# Patient Record
Sex: FEMALE | Race: WHITE | NOT HISPANIC OR LATINO | URBAN - METROPOLITAN AREA
[De-identification: names, ages, dates, MRNs, and addresses within clinical notes are randomized per-mention and may not be internally consistent; named-entity substitution may affect disease eponyms.]

---

## 2021-12-16 ENCOUNTER — TELEPHONE ENCOUNTER (OUTPATIENT)
Dept: URBAN - METROPOLITAN AREA CLINIC 72 | Facility: CLINIC | Age: 61
End: 2021-12-16

## 2022-01-25 ENCOUNTER — OFFICE VISIT (OUTPATIENT)
Dept: URBAN - METROPOLITAN AREA CLINIC 72 | Facility: CLINIC | Age: 62
End: 2022-01-25

## 2022-03-04 ENCOUNTER — WEB ENCOUNTER (OUTPATIENT)
Dept: URBAN - METROPOLITAN AREA CLINIC 72 | Facility: CLINIC | Age: 62
End: 2022-03-04

## 2022-03-04 ENCOUNTER — OFFICE VISIT (OUTPATIENT)
Dept: URBAN - METROPOLITAN AREA CLINIC 72 | Facility: CLINIC | Age: 62
End: 2022-03-04
Payer: COMMERCIAL

## 2022-03-04 VITALS
RESPIRATION RATE: 16 BRPM | TEMPERATURE: 98.1 F | HEART RATE: 85 BPM | SYSTOLIC BLOOD PRESSURE: 137 MMHG | DIASTOLIC BLOOD PRESSURE: 95 MMHG | WEIGHT: 169 LBS | HEIGHT: 68 IN | BODY MASS INDEX: 25.61 KG/M2

## 2022-03-04 DIAGNOSIS — K90.9 INTESTINAL MALABSORPTION, UNSPECIFIED: ICD-10-CM

## 2022-03-04 DIAGNOSIS — R19.7 DIARRHEA, UNSPECIFIED: ICD-10-CM

## 2022-03-04 DIAGNOSIS — K21.9 GERD WITHOUT ESOPHAGITIS: ICD-10-CM

## 2022-03-04 DIAGNOSIS — Z86.010 HISTORY OF COLON POLYPS: ICD-10-CM

## 2022-03-04 PROCEDURE — 99204 OFFICE O/P NEW MOD 45 MIN: CPT | Performed by: INTERNAL MEDICINE

## 2022-03-04 RX ORDER — HYDROCHLOROTHIAZIDE 25 MG/1
1 TABLET IN THE MORNING TABLET ORAL ONCE A DAY
Status: ACTIVE | COMMUNITY

## 2022-03-04 RX ORDER — OMEPRAZOLE 20 MG/1
1 CAPSULE 30 MINUTES BEFORE MORNING MEAL CAPSULE, DELAYED RELEASE ORAL ONCE A DAY
Qty: 30 | OUTPATIENT
Start: 2022-03-04

## 2022-03-04 RX ORDER — FAMOTIDINE 20 MG/1
1 TABLET AT BEDTIME AS NEEDED TABLET, FILM COATED ORAL
Status: ACTIVE | COMMUNITY

## 2022-03-04 RX ORDER — LORATADINE 10 MG
1 TABLET TABLET ORAL ONCE A DAY
Status: ACTIVE | COMMUNITY

## 2022-03-04 RX ORDER — SEMAGLUTIDE 0.5 MG/.5ML
0.5 ML INJECTION, SOLUTION SUBCUTANEOUS
Status: ACTIVE | COMMUNITY

## 2022-03-04 RX ORDER — OMEPRAZOLE 20 MG/1
1 CAPSULE 30 MINUTES BEFORE MORNING MEAL CAPSULE, DELAYED RELEASE ORAL
Status: ACTIVE | COMMUNITY

## 2022-03-04 RX ORDER — COLESTIPOL HYDROCHLORIDE 1 G/1
2 TABLETS TABLET, FILM COATED ORAL ONCE A DAY
Qty: 60 | OUTPATIENT
Start: 2022-03-04

## 2022-03-04 RX ORDER — LOPERAMIDE HYDROCHLORIDE AND SIMETHICONE 2; 125 MG/1; MG/1
1 TABLET TABLET ORAL
Status: ACTIVE | COMMUNITY

## 2022-03-04 RX ORDER — LOSARTAN POTASSIUM 50 MG/1
1 TABLET TABLET ORAL ONCE A DAY
Status: ACTIVE | COMMUNITY

## 2022-03-04 NOTE — HPI-TODAY'S VISIT:
Mrs. Israel is a pleasant 61-year-old female who presents as a new patient for evaluation for colonoscopy, she was referred by Dr. Chiquita Webster.  She reports she is not due for colonoscopy. She reports that she has issues with diarrhea, has had diarrhea since having cholecystectomy. She takes cholestyramine but reports that it leads to cough the patient. She also notes issues with GERD. Has felt like things get stuck in her esophagus intermittently.

## 2022-05-12 ENCOUNTER — TELEPHONE ENCOUNTER (OUTPATIENT)
Dept: URBAN - METROPOLITAN AREA CLINIC 72 | Facility: CLINIC | Age: 62
End: 2022-05-12

## 2022-05-12 RX ORDER — COLESTIPOL HYDROCHLORIDE 1 G/1
2 TABLETS TABLET, FILM COATED ORAL ONCE A DAY
Qty: 60
Start: 2022-03-04

## 2022-07-12 ENCOUNTER — TELEPHONE ENCOUNTER (OUTPATIENT)
Dept: URBAN - METROPOLITAN AREA CLINIC 72 | Facility: CLINIC | Age: 62
End: 2022-07-12

## 2022-08-30 ENCOUNTER — TELEPHONE ENCOUNTER (OUTPATIENT)
Dept: URBAN - METROPOLITAN AREA CLINIC 72 | Facility: CLINIC | Age: 62
End: 2022-08-30

## 2022-09-01 PROBLEM — 62315008: Status: ACTIVE | Noted: 2022-08-31

## 2022-09-01 PROBLEM — 275978004 SCREENING FOR MALIGNANT NEOPLASM OF COLON: Status: ACTIVE | Noted: 2022-08-31

## 2022-09-30 ENCOUNTER — WEB ENCOUNTER (OUTPATIENT)
Dept: URBAN - METROPOLITAN AREA CLINIC 72 | Facility: CLINIC | Age: 62
End: 2022-09-30

## 2022-10-03 ENCOUNTER — OFFICE VISIT (OUTPATIENT)
Dept: URBAN - METROPOLITAN AREA MEDICAL CENTER 40 | Facility: MEDICAL CENTER | Age: 62
End: 2022-10-03

## 2022-11-11 ENCOUNTER — OFFICE VISIT (OUTPATIENT)
Dept: URBAN - METROPOLITAN AREA CLINIC 72 | Facility: CLINIC | Age: 62
End: 2022-11-11

## 2023-01-10 ENCOUNTER — WEB ENCOUNTER (OUTPATIENT)
Dept: URBAN - METROPOLITAN AREA CLINIC 72 | Facility: CLINIC | Age: 63
End: 2023-01-10

## 2023-01-10 RX ORDER — COLESTIPOL HYDROCHLORIDE 1 G/1
2 TABLETS TABLET, FILM COATED ORAL ONCE A DAY
Qty: 60
Start: 2022-03-04

## 2023-01-11 ENCOUNTER — WEB ENCOUNTER (OUTPATIENT)
Dept: URBAN - METROPOLITAN AREA CLINIC 72 | Facility: CLINIC | Age: 63
End: 2023-01-11

## 2023-02-18 ENCOUNTER — WEB ENCOUNTER (OUTPATIENT)
Dept: URBAN - METROPOLITAN AREA CLINIC 72 | Facility: CLINIC | Age: 63
End: 2023-02-18

## 2023-02-18 RX ORDER — COLESTIPOL HYDROCHLORIDE 1 G/1
TAKE TWO TABLETS BY MOUTH ONE TIME DAILY FOR 30 DAYS TABLET ORAL
Qty: 60 TABLET | Refills: 0

## 2023-02-21 ENCOUNTER — WEB ENCOUNTER (OUTPATIENT)
Dept: URBAN - METROPOLITAN AREA CLINIC 72 | Facility: CLINIC | Age: 63
End: 2023-02-21

## 2023-02-21 ENCOUNTER — ERX REFILL RESPONSE (OUTPATIENT)
Dept: URBAN - METROPOLITAN AREA CLINIC 72 | Facility: CLINIC | Age: 63
End: 2023-02-21

## 2023-02-21 RX ORDER — COLESTIPOL HYDROCHLORIDE 1 G/1
2 TABLETS TABLET, FILM COATED ORAL ONCE A DAY
Qty: 60 | OUTPATIENT

## 2023-02-21 RX ORDER — COLESTIPOL HYDROCHLORIDE 1 G/1
TAKE TWO TABLETS BY MOUTH ONE TIME DAILY FOR 30 DAYS TABLET ORAL
Qty: 60 TABLET | Refills: 0 | OUTPATIENT

## 2023-02-25 ENCOUNTER — WEB ENCOUNTER (OUTPATIENT)
Dept: URBAN - METROPOLITAN AREA CLINIC 72 | Facility: CLINIC | Age: 63
End: 2023-02-25

## 2023-02-25 RX ORDER — COLESTIPOL HYDROCHLORIDE 1 G/1
TAKE TWO TABLETS BY MOUTH ONE TIME DAILY FOR 30 DAYS TABLET ORAL
Qty: 60 TABLET | Refills: 0

## 2023-03-01 ENCOUNTER — DASHBOARD ENCOUNTERS (OUTPATIENT)
Age: 63
End: 2023-03-01

## 2023-03-01 ENCOUNTER — WEB ENCOUNTER (OUTPATIENT)
Dept: URBAN - METROPOLITAN AREA CLINIC 72 | Facility: CLINIC | Age: 63
End: 2023-03-01

## 2023-03-01 ENCOUNTER — OFFICE VISIT (OUTPATIENT)
Dept: URBAN - METROPOLITAN AREA CLINIC 72 | Facility: CLINIC | Age: 63
End: 2023-03-01
Payer: COMMERCIAL

## 2023-03-01 VITALS
BODY MASS INDEX: 22.58 KG/M2 | SYSTOLIC BLOOD PRESSURE: 127 MMHG | TEMPERATURE: 97.5 F | HEIGHT: 68 IN | WEIGHT: 149 LBS | DIASTOLIC BLOOD PRESSURE: 84 MMHG | HEART RATE: 98 BPM

## 2023-03-01 DIAGNOSIS — K90.9 INTESTINAL MALABSORPTION, UNSPECIFIED: ICD-10-CM

## 2023-03-01 DIAGNOSIS — K21.9 GERD WITHOUT ESOPHAGITIS: ICD-10-CM

## 2023-03-01 DIAGNOSIS — Z86.010 HISTORY OF COLON POLYPS: ICD-10-CM

## 2023-03-01 PROBLEM — 62315008: Status: ACTIVE | Noted: 2022-03-04

## 2023-03-01 PROBLEM — 428283002: Status: ACTIVE | Noted: 2022-03-04

## 2023-03-01 PROBLEM — 266435005: Status: ACTIVE | Noted: 2022-03-04

## 2023-03-01 PROCEDURE — 99213 OFFICE O/P EST LOW 20 MIN: CPT | Performed by: INTERNAL MEDICINE

## 2023-03-01 RX ORDER — COLESTIPOL HYDROCHLORIDE 1 G/1
TAKE TWO TABLETS BY MOUTH ONE TIME DAILY FOR 30 DAYS TABLET ORAL
Qty: 60 TABLET | Refills: 0 | Status: ACTIVE | COMMUNITY

## 2023-03-01 RX ORDER — HYDROCHLOROTHIAZIDE 25 MG/1
1 TABLET IN THE MORNING TABLET ORAL ONCE A DAY
Status: ON HOLD | COMMUNITY

## 2023-03-01 RX ORDER — LOPERAMIDE HYDROCHLORIDE AND SIMETHICONE 2; 125 MG/1; MG/1
1 TABLET TABLET ORAL
Status: ACTIVE | COMMUNITY

## 2023-03-01 RX ORDER — FAMOTIDINE 20 MG/1
1 TABLET AT BEDTIME AS NEEDED TABLET, FILM COATED ORAL
Status: ACTIVE | COMMUNITY

## 2023-03-01 RX ORDER — LORATADINE 10 MG
1 TABLET TABLET ORAL ONCE A DAY
Status: ACTIVE | COMMUNITY

## 2023-03-01 RX ORDER — OMEPRAZOLE 20 MG/1
1 CAPSULE 30 MINUTES BEFORE MORNING MEAL CAPSULE, DELAYED RELEASE ORAL ONCE A DAY
Qty: 30 | Status: ACTIVE | COMMUNITY
Start: 2022-03-04

## 2023-03-01 RX ORDER — COLESTIPOL HYDROCHLORIDE 1 G/1
2 TABLETS TABLET ORAL ONCE A DAY
Qty: 180 TABLET | Refills: 2

## 2023-03-01 RX ORDER — SEMAGLUTIDE 0.5 MG/.5ML
0.5 ML INJECTION, SOLUTION SUBCUTANEOUS
Status: ACTIVE | COMMUNITY

## 2023-03-01 RX ORDER — LOSARTAN POTASSIUM 50 MG/1
1 TABLET TABLET ORAL ONCE A DAY
Status: ACTIVE | COMMUNITY

## 2023-03-01 RX ORDER — OMEPRAZOLE 20 MG/1
1 CAPSULE 30 MINUTES BEFORE MORNING MEAL CAPSULE, DELAYED RELEASE ORAL
Status: ACTIVE | COMMUNITY

## 2023-03-01 NOTE — HPI-TODAY'S VISIT:
Mrs. Israel returns for follow-up, recall she is a pleasant 62-year-old female last seen in office on 3/4/2022.  She has history of bile salt induced diarrhea, history of colon polyps and GERD.  At her last visit we started her on omeprazole and colestipol.  She is not yet due for colonoscopy.  She did occasionally have issues with food getting stuck and we encouraged an upper endoscopy which she ultimately canceled the procedure.  She now returns for follow-up. Her dysphagia has resolved no issues with reflux she also notes that she found records from EGD when she last had a colonoscopy and everything there was normal.  She would like to try to wean the omeprazole, currently taking 20 mg a day try to drop this to every other day.  She will try famotidine as needed if her symptoms return.  She is getting good control on the colestipol asked for refill of this.  No complaints today feeling well, has had some weight loss relates this to her Wegovy use.  Normal stools.

## 2023-04-11 ENCOUNTER — ERX REFILL RESPONSE (OUTPATIENT)
Dept: URBAN - METROPOLITAN AREA CLINIC 72 | Facility: CLINIC | Age: 63
End: 2023-04-11

## 2023-04-11 RX ORDER — COLESTIPOL HYDROCHLORIDE 1 G/1
2 TABLETS TABLET ORAL ONCE A DAY
Qty: 180 TABLET | Refills: 2 | OUTPATIENT

## 2024-01-18 ENCOUNTER — TELEPHONE ENCOUNTER (OUTPATIENT)
Dept: URBAN - METROPOLITAN AREA CLINIC 113 | Facility: CLINIC | Age: 64
End: 2024-01-18

## 2024-01-18 RX ORDER — SOD SULF/POT CHLORIDE/MAG SULF 1.479 G
12 TABLETS TABLET ORAL
Qty: 24 | Refills: 0 | OUTPATIENT
Start: 2024-01-19 | End: 2024-01-20

## 2024-01-19 ENCOUNTER — LAB OUTSIDE AN ENCOUNTER (OUTPATIENT)
Dept: URBAN - METROPOLITAN AREA CLINIC 113 | Facility: CLINIC | Age: 64
End: 2024-01-19

## 2024-07-26 ENCOUNTER — ERX REFILL RESPONSE (OUTPATIENT)
Dept: URBAN - METROPOLITAN AREA CLINIC 72 | Facility: CLINIC | Age: 64
End: 2024-07-26

## 2024-07-26 RX ORDER — COLESTIPOL HYDROCHLORIDE 1 G/1
TAKE TWO TABLETS BY MOUTH ONE TIME DAILY TABLET ORAL
Qty: 180 TABLET | Refills: 0 | OUTPATIENT

## 2024-07-26 RX ORDER — COLESTIPOL HYDROCHLORIDE 1 G/1
2 TABLETS TABLET ORAL ONCE A DAY
Qty: 180 | Refills: 0 | OUTPATIENT

## 2024-09-12 ENCOUNTER — TELEPHONE ENCOUNTER (OUTPATIENT)
Dept: URBAN - METROPOLITAN AREA CLINIC 113 | Facility: CLINIC | Age: 64
End: 2024-09-12

## 2024-09-19 ENCOUNTER — OFFICE VISIT (OUTPATIENT)
Dept: URBAN - METROPOLITAN AREA MEDICAL CENTER 40 | Facility: MEDICAL CENTER | Age: 64
End: 2024-09-19
Payer: COMMERCIAL

## 2024-09-19 DIAGNOSIS — Z86.010 ADENOMAS PERSONAL HISTORY OF COLONIC POLYPS: ICD-10-CM

## 2024-09-19 DIAGNOSIS — K63.5 BENIGN COLON POLYP: ICD-10-CM

## 2024-09-19 PROCEDURE — 0529F INTRVL 3/>YR PTS CLNSCP DOCD: CPT | Performed by: INTERNAL MEDICINE

## 2024-09-19 PROCEDURE — 45380 COLONOSCOPY AND BIOPSY: CPT | Performed by: INTERNAL MEDICINE

## 2024-11-11 ENCOUNTER — ERX REFILL RESPONSE (OUTPATIENT)
Dept: URBAN - METROPOLITAN AREA CLINIC 72 | Facility: CLINIC | Age: 64
End: 2024-11-11

## 2024-11-11 RX ORDER — COLESTIPOL HYDROCHLORIDE 1 G/1
TAKE TWO TABLETS BY MOUTH ONE TIME DAILY TABLET ORAL
Qty: 180 TABLET | Refills: 0 | OUTPATIENT

## 2025-02-10 ENCOUNTER — ERX REFILL RESPONSE (OUTPATIENT)
Dept: URBAN - METROPOLITAN AREA CLINIC 72 | Facility: CLINIC | Age: 65
End: 2025-02-10

## 2025-02-10 RX ORDER — COLESTIPOL HYDROCHLORIDE 1 G/1
TAKE TWO TABLETS BY MOUTH ONE TIME DAILY TABLET ORAL
Qty: 180 TABLET | Refills: 0 | OUTPATIENT

## 2025-05-15 ENCOUNTER — ERX REFILL RESPONSE (OUTPATIENT)
Dept: URBAN - METROPOLITAN AREA CLINIC 72 | Facility: CLINIC | Age: 65
End: 2025-05-15

## 2025-05-15 RX ORDER — COLESTIPOL HYDROCHLORIDE 1 G/1
TAKE TWO TABLETS BY MOUTH ONE TIME DAILY TABLET ORAL
Qty: 180 TABLET | Refills: 0 | OUTPATIENT

## 2025-05-15 RX ORDER — COLESTIPOL HYDROCHLORIDE 1 G/1
TAKE TWO TABLETS BY MOUTH ONE TIME DAILY TABLET, FILM COATED ORAL
Qty: 180 TABLET | Refills: 0 | OUTPATIENT

## 2025-07-21 ENCOUNTER — ERX REFILL RESPONSE (OUTPATIENT)
Dept: URBAN - METROPOLITAN AREA CLINIC 72 | Facility: CLINIC | Age: 65
End: 2025-07-21

## 2025-07-21 RX ORDER — COLESTIPOL HYDROCHLORIDE 1 G/1
TAKE TWO TABLETS BY MOUTH ONE TIME DAILY TABLET, FILM COATED ORAL
Qty: 180 TABLET | Refills: 0 | OUTPATIENT